# Patient Record
Sex: MALE | Race: WHITE
[De-identification: names, ages, dates, MRNs, and addresses within clinical notes are randomized per-mention and may not be internally consistent; named-entity substitution may affect disease eponyms.]

---

## 2018-11-20 ENCOUNTER — HOSPITAL ENCOUNTER (OUTPATIENT)
Dept: HOSPITAL 88 - OR | Age: 63
Discharge: HOME | End: 2018-11-20
Attending: INTERNAL MEDICINE
Payer: COMMERCIAL

## 2018-11-20 VITALS — SYSTOLIC BLOOD PRESSURE: 114 MMHG | DIASTOLIC BLOOD PRESSURE: 62 MMHG

## 2018-11-20 DIAGNOSIS — D12.4: ICD-10-CM

## 2018-11-20 DIAGNOSIS — I10: ICD-10-CM

## 2018-11-20 DIAGNOSIS — D12.3: ICD-10-CM

## 2018-11-20 DIAGNOSIS — D12.5: ICD-10-CM

## 2018-11-20 DIAGNOSIS — D12.8: ICD-10-CM

## 2018-11-20 DIAGNOSIS — G47.33: ICD-10-CM

## 2018-11-20 DIAGNOSIS — Z12.11: Primary | ICD-10-CM

## 2018-11-20 DIAGNOSIS — E78.5: ICD-10-CM

## 2018-11-20 DIAGNOSIS — Z01.810: ICD-10-CM

## 2018-11-20 DIAGNOSIS — K64.8: ICD-10-CM

## 2018-11-20 DIAGNOSIS — E11.9: ICD-10-CM

## 2018-11-20 DIAGNOSIS — Z79.84: ICD-10-CM

## 2018-11-20 DIAGNOSIS — D12.2: ICD-10-CM

## 2018-11-20 PROCEDURE — 36415 COLL VENOUS BLD VENIPUNCTURE: CPT

## 2018-11-20 PROCEDURE — 45378 DIAGNOSTIC COLONOSCOPY: CPT

## 2018-11-20 PROCEDURE — 93005 ELECTROCARDIOGRAM TRACING: CPT

## 2018-11-20 PROCEDURE — 45385 COLONOSCOPY W/LESION REMOVAL: CPT

## 2018-11-20 PROCEDURE — 82948 REAGENT STRIP/BLOOD GLUCOSE: CPT

## 2018-11-20 PROCEDURE — 45384 COLONOSCOPY W/LESION REMOVAL: CPT

## 2020-01-28 LAB
ANION GAP SERPL CALC-SCNC: 15.3 MMOL/L (ref 8–16)
BASOPHILS # BLD AUTO: 0.1 10*3/UL (ref 0–0.1)
BASOPHILS NFR BLD AUTO: 0.7 % (ref 0–1)
BUN SERPL-MCNC: 27 MG/DL (ref 7–26)
BUN/CREAT SERPL: 14 (ref 6–25)
CALCIUM SERPL-MCNC: 9.7 MG/DL (ref 8.4–10.2)
CHLORIDE SERPL-SCNC: 104 MMOL/L (ref 98–107)
CO2 SERPL-SCNC: 25 MMOL/L (ref 22–29)
DEPRECATED NEUTROPHILS # BLD AUTO: 7.1 10*3/UL (ref 2.1–6.9)
EGFRCR SERPLBLD CKD-EPI 2021: 36 ML/MIN (ref 60–?)
EOSINOPHIL # BLD AUTO: 0.4 10*3/UL (ref 0–0.4)
EOSINOPHIL NFR BLD AUTO: 3.5 % (ref 0–6)
ERYTHROCYTE [DISTWIDTH] IN CORD BLOOD: 13.6 % (ref 11.7–14.4)
GLUCOSE SERPLBLD-MCNC: 165 MG/DL (ref 74–118)
HCT VFR BLD AUTO: 41 % (ref 38.2–49.6)
HGB BLD-MCNC: 13.2 G/DL (ref 14–18)
LYMPHOCYTES # BLD: 1.5 10*3/UL (ref 1–3.2)
LYMPHOCYTES NFR BLD AUTO: 14.9 % (ref 18–39.1)
MCH RBC QN AUTO: 27.6 PG (ref 28–32)
MCHC RBC AUTO-ENTMCNC: 32.2 G/DL (ref 31–35)
MCV RBC AUTO: 85.6 FL (ref 81–99)
MONOCYTES # BLD AUTO: 1.1 10*3/UL (ref 0.2–0.8)
MONOCYTES NFR BLD AUTO: 10.6 % (ref 4.4–11.3)
NEUTS SEG NFR BLD AUTO: 69.7 % (ref 38.7–80)
PLATELET # BLD AUTO: 240 X10E3/UL (ref 140–360)
POTASSIUM SERPL-SCNC: 4.3 MMOL/L (ref 3.5–5.1)
RBC # BLD AUTO: 4.79 X10E6/UL (ref 4.3–5.7)
SODIUM SERPL-SCNC: 140 MMOL/L (ref 136–145)

## 2020-01-28 NOTE — DIAGNOSTIC IMAGING REPORT
EXAMINATION:  CHEST 2 VIEWS    



INDICATION: Pre-operative



COMPARISON: None

     

FINDINGS:



LINES/TUBES:None



LUNGS:The lungs are well-inflated. No focal consolidation or pulmonary edema.



PLEURA:No pleural effusion or pneumothorax.



MEDIASTINUM:The cardiomediastinal silhouette appears normal in size and shape.



BONES/SOFT TISSUES:No acute osseous injury.



ABDOMEN:No free air under the diaphragm.





IMPRESSION: 

No focal pneumonia or pulmonary edema.



Signed by: Madhu Leach MD on 1/28/2020 5:19 PM

## 2020-01-31 ENCOUNTER — HOSPITAL ENCOUNTER (OUTPATIENT)
Dept: HOSPITAL 88 - OR | Age: 65
Discharge: HOME | End: 2020-01-31
Attending: PODIATRIST
Payer: COMMERCIAL

## 2020-01-31 VITALS — SYSTOLIC BLOOD PRESSURE: 128 MMHG | DIASTOLIC BLOOD PRESSURE: 81 MMHG

## 2020-01-31 DIAGNOSIS — E11.9: ICD-10-CM

## 2020-01-31 DIAGNOSIS — Z01.811: ICD-10-CM

## 2020-01-31 DIAGNOSIS — G47.33: ICD-10-CM

## 2020-01-31 DIAGNOSIS — Z01.812: ICD-10-CM

## 2020-01-31 DIAGNOSIS — Z96.652: ICD-10-CM

## 2020-01-31 DIAGNOSIS — M79.671: ICD-10-CM

## 2020-01-31 DIAGNOSIS — I10: ICD-10-CM

## 2020-01-31 DIAGNOSIS — S92.351A: Primary | ICD-10-CM

## 2020-01-31 DIAGNOSIS — Z79.84: ICD-10-CM

## 2020-01-31 DIAGNOSIS — Z88.7: ICD-10-CM

## 2020-01-31 DIAGNOSIS — Z01.810: ICD-10-CM

## 2020-01-31 PROCEDURE — 71046 X-RAY EXAM CHEST 2 VIEWS: CPT

## 2020-01-31 PROCEDURE — 28485 OPTX METATARSAL FX EACH: CPT

## 2020-01-31 PROCEDURE — 80048 BASIC METABOLIC PNL TOTAL CA: CPT

## 2020-01-31 PROCEDURE — 36415 COLL VENOUS BLD VENIPUNCTURE: CPT

## 2020-01-31 PROCEDURE — 93005 ELECTROCARDIOGRAM TRACING: CPT

## 2020-01-31 PROCEDURE — 85025 COMPLETE CBC W/AUTO DIFF WBC: CPT

## 2020-01-31 PROCEDURE — 82948 REAGENT STRIP/BLOOD GLUCOSE: CPT

## 2020-01-31 NOTE — OPERATIVE REPORT
DATE OF PROCEDURE:  01/31/2020

 

SURGEON:  Kyle Koehler DPM

 

ROOM NUMBER:  [facility].

 

PREOPERATIVE DIAGNOSIS:  Ramos fracture, 5th metatarsal, right foot.

 

POSTOPERATIVE DIAGNOSIS:  Ramos fracture, 5th metatarsal, right foot.

 

TITLE OF THE OPERATION:  Open reduction with internal fixation, 5th metatarsal, right

foot. 

 

ANESTHESIA:  General endotracheal.

 

HEMOSTASIS:  A right thigh tourniquet at 350 mmHg.

 

PROCEDURE IN DETAIL:  The patient was taken to the operating room in a mildly sedated

state and placed on the operating table in supine position.  Following induction of

general anesthetic, the right lower extremity was elevated to 60 degrees to exsanguinate

before inflating the pneumatic thigh tourniquet to 350 mmHg to create hemostasis.  The

right lower extremity was placed on the operating table prior to performing the

following procedure: 

 

Procedure #1:  ORIF of the 5th metatarsal, right foot.  Under fluoroscopy, the Ramos

fracture was identified, noted to be significantly open and no bone healing had

occurred.  A linear longitudinal incision was made overlying the proximal 5th met base

all the way to an area just distal to the Ramos fracture, which was distal to the

proximal joint, but certainly still within the proximal one-half of the 5th metatarsal.

The bones seem to line up well when reorganize, but was very unstable.  A cannulated

screw was advanced 4.0 in nature from proximal 5th met base all the way through the

medial cortex of the 5th metatarsal distal to the fracture, which allowed for excellent

stabilization.  A 5th metatarsal compression plate was applied to the dorsal and lateral

aspect of the 5th metatarsal at this point, also showing excellent fixation and stable

level configuration and construct.  The area was irrigated with copious amounts of

sterile saline solution.  Deep closure with 3-0 Vicryl, subcutaneous closure with 4-0

Vicryl, and skin closure with 4-0 nylon.  The areas of surgery were then blocked with

0.5 Marcaine and Decadron LA.  Release of the pneumatic thigh tourniquet showed a normal

hyperemic flush to all digits of the right foot.  The patient tolerated both anesthetic

and procedure very well. 

 

 

 

 

______________________________

MARY BETH Manning/MODL

D:  01/31/2020 10:24:32

T:  01/31/2020 17:49:48

Job #:  [id number]

## 2020-01-31 NOTE — XMS REPORT
Patient Summary Document

                             Created on: 2020



BABATUNDE VICKERS

External Reference #: [id number]

: 1955

Sex: Male



Demographics







                          Address                   [address] [place] [address] [place]  [address]

 

                          Home Phone                [phone]

 

                          Preferred Language        Unknown

 

                          Marital Status            Unknown

 

                          Scientologist Affiliation     Unknown

 

                          Race                      Unknown

 

                                        Additional Race(s)  

 

                          Ethnic Group              Unknown





Author







                          Author                    [facility]

 

                          Address                   Unknown

 

                          Phone                     Unavailable







Care Team Providers







                    Care Team Member Name    Role                Phone

 

                    RADHA BARBOSA    Unavailable         Unavailable







Problems

This patient has no known problems.



Allergies, Adverse Reactions, Alerts

This patient has no known allergies or adverse reactions.



Medications

This patient has no known medications.



Results







           Test Description    Test Time    Test Comments    Text Results    Atomic Results    Result

 Comments

 

                CHEST 2 VIEWS    2020 17:18:00                                                             

                                             [facility]  
                     [address] [address]  
   Patient Name: BABATUNDE VICKERS                                   MR #: [id number]  
                  : 1955                                   Age/Sex: 
64/M  Acct #: [id number]                              Req #: [id number]  Adm 
Physician:                                                      Ordered by: 
RADHA BARBOSA DPM                            Report #: [id number]        
Location: OR                                      Room/Bed:                     
___________________________________________________________________________________________________
   Procedure: [id number] DX/CHEST 2 VIEWS  Exam Date:                            
Exam Time:                                               REPORT STATUS: Signed  
 EXAMINATION:  CHEST 2 VIEWS          INDICATION: Pre-operative      COMPARISON:
None           FINDINGS:      LINES/TUBES:None      LUNGS:The lungs are well-
inflated. No focal consolidation or pulmonary edema.      PLEURA:No pleural 
effusion or pneumothorax.      MEDIASTINUM:The cardiomediastinal silhouette 
appears normal in size and shape.      BONES/SOFT TISSUES:No acute osseous 
injury.      ABDOMEN:No free air under the diaphragm.         IMPRESSION:    No 
focal pneumonia or pulmonary edema.      Signed by: Daniella Hicks MD on 2020 
5:19 PM        Dictated By: DANIELLA HICKS MD  Electronically Signed By: DANIELLA HICKS MD on 20  Transcribed By: JACKIE on 20       COPY TO:   
RADHA BARBOSA DPM

## 2020-11-19 ENCOUNTER — HOSPITAL ENCOUNTER (EMERGENCY)
Dept: HOSPITAL 88 - ER | Age: 65
Discharge: HOME | End: 2020-11-19
Payer: COMMERCIAL

## 2020-11-19 VITALS — HEIGHT: 68 IN | BODY MASS INDEX: 47.74 KG/M2 | WEIGHT: 315 LBS

## 2020-11-19 VITALS — DIASTOLIC BLOOD PRESSURE: 89 MMHG | SYSTOLIC BLOOD PRESSURE: 129 MMHG

## 2020-11-19 DIAGNOSIS — R06.02: ICD-10-CM

## 2020-11-19 DIAGNOSIS — E78.5: ICD-10-CM

## 2020-11-19 DIAGNOSIS — E11.9: ICD-10-CM

## 2020-11-19 DIAGNOSIS — R50.9: ICD-10-CM

## 2020-11-19 DIAGNOSIS — I10: ICD-10-CM

## 2020-11-19 DIAGNOSIS — J12.9: Primary | ICD-10-CM

## 2020-11-19 DIAGNOSIS — E78.00: ICD-10-CM

## 2020-11-19 DIAGNOSIS — R05: ICD-10-CM

## 2020-11-19 PROCEDURE — 71045 X-RAY EXAM CHEST 1 VIEW: CPT

## 2020-11-19 PROCEDURE — 99283 EMERGENCY DEPT VISIT LOW MDM: CPT

## 2020-11-19 NOTE — EMERGENCY DEPARTMENT NOTE
History of Present Illnes


History of Present Illness


Chief Complaint:  COVID PUI


History of Present Illness


This is a 65 year old  male in from home with complaints of shortness 

of breath, cough, fever and


   general body aches since Saturday. Patient reports he went to get covid 

   testing


   two days ago but has not received results yet. Patient is slightly short of


   breath on exertion but no distress noted at rest. Patient's oxygen saturation

   in


   triage is 97% on room air. pt states both daughters positive for covid


Historian:  Patient


Arrival Mode:  Car


 Required:  No


Onset (how long ago):  day(s) (5)


Location:  all over


Quality:  fever, chills, body aches, cough


Radiation:  Reports non-radiation


Severity:  mild


Onset quality:  gradual


Duration (how long):  day(s) (6)


Timing of current episode:  constant


Progression:  unchanged


Chronicity:  new


Context:  Reports other (pt with family members who are positive for covid)


Relieving factors:  none


Exacerbating factors:  other (cough)


Associated symptoms:  Reports denies other symptoms





Past Medical/Family History


Physician Review


I have reviewed the patient's past medical and family history.  Any updates have

been documented here.





Past Medical History


Recent Fever:  Yes


Clinical Suspicion of Infectio:  Yes


New/Unexplained Change in Ment:  No


Past Medical History:  Hypertension, Diabetes, Hyperlipedemia


Other Medical History:  


High cholesterol





Venous stasis


Other Surgery:  


removal of cyst from neck  





L knee surgery





Social History


Smoking Cessation:  Never Smoker


Alcohol Use:  Occasional


Any Illegal Drug Use:  No





Other


Last Tetanus:  OOD





Review of Systems


Review of Systems


Constitutional:  Reports as per HPI


EENTM:  Reports no symptoms


Cardiovascular:  Reports no symptoms


Respiratory:  Reports as per HPI


Gastrointestinal:  Reports no symptoms


Genitourinary:  Reports no symptoms


Musculoskeletal:  Reports no symptoms


Integumentary:  Reports no symptoms


Neurological:  Reports no symptoms


Psychological:  Reports no symptoms


Endocrine:  Reports no symptoms


Hematological/Lymphatic:  Reports no symptoms





Physical Exam


Related Data


Allergies:  


Uncoded Allergies:  


     FLU VACCINE (Allergy, Unknown, 6/14/17)


Triage Vital Signs





Vital Signs








  Date Time  Temp Pulse Resp B/P (MAP) Pulse Ox O2 Delivery O2 Flow Rate FiO2


 


11/19/20 17:10 99.4 100 22 119/58 97 Room Air  








Vital signs reviewed:  Yes





Physical Exam


CONSTITUTIONAL





Constitutional:  Present well-developed, Present well-nourished


HENT


HENT:  Present normocephalic, Present atraumatic, Present oropharynx 

clear/moist, Present nose normal


HENT L/R:  Present left ext ear normal, Present right ext ear normal


EYES





Eyes:  Reports PERRL, Reports conjunctivae normal


NECK


Neck:  Present ROM normal


PULMONARY


Pulmonary:  Present effort normal, Present rhonchi (mild bases), Present other 

(mild decreased breath sounds bilateral bases)


CARDIOVASCULAR





Cardiovascular:  Present regular rhythm, Present heart sounds normal, Present 

capillary refill normal, Present normal rate


GASTROINTESTINAL





Abdominal:  Present soft, Present nontender, Present bowel sounds normal


GENITOURINARY





Genitourinary:  Present exam deferred


SKIN


Skin:  Present warm, Present dry


MUSCULOSKELETAL





Musculoskeletal:  Present ROM normal


NEUROLOGICAL





Neurological:  Present alert, Present oriented x 3, Present no gross motor or 

sensory deficits


PSYCHOLOGICAL


Psychological:  Present mood/affect normal, Present judgement normal





Results


Imaging


Imaging results reviewed:  Yes


Impressions


Procedure: [id number] DX/CHEST SINGLE (PORTABLE)


Exam Date: 11/19/20                            Exam Time: 1833








                              REPORT STATUS: Signed





EXAMINATION:  CHEST SINGLE (PORTABLE)    





INDICATION:      


^Y


^sob,covid suspect


^[id number]


^1833


^Y





COMPARISON:  Chest x-ray on 1/28/2020.


     


FINDINGS:    





TUBES and LINES:  None.





LUNGS: There is diffuse interstitial lung markings and patchy and hazy


opacification the bilateral lung bases..





PLEURA:  No pleural effusion or pneumothorax.





HEART AND MEDIASTINUM:  The cardiomediastinal silhouette is unremarkable.    





BONES AND SOFT TISSUES:  No acute osseous lesion.  Soft tissues are


unremarkable.





UPPER ABDOMEN: No free air under the diaphragm.    





IMPRESSION: 





Patchy and hazy opacification bilateral lung bases which most likely represents


atelectasis and/or multifocal pneumonia in the proper clinical context. There


is probable superimposed pulmonary edema.








Signed by: Germania Gilman MD on 11/19/2020 6:59 PM








Dictated By: GERMANIA GILMAN MD


Electronically Signed By: GERMANIA GILMAN MD on 11/19/20 1859


Transcribed By: JACKIE on 11/19/20 1859 








COPY TO:   MIGUEL DRAPER MD~





Assessment & Plan


Medical Decision Making


MDM


pt with covid symptoms and exposure to family members with covid





cxr ordered to eval for pneumonia





Assessment & Plan


Final Impression:  


(1) Suspected 2019 novel coronavirus infection


(2) Viral pneumonia


Depart Disposition:  HOME, SELF-CARE


Last Vital Signs











  Date Time  Temp Pulse Resp B/P (MAP) Pulse Ox O2 Delivery O2 Flow Rate FiO2


 


11/19/20 17:10 99.4 100 22 119/58 97 Room Air  








Home Meds


Reported Medications


Losartan Potassium (LOSARTAN POTASSIUM) 25 Mg Tablet, 50 MG PO DAILY


   6/14/17


Simvastatin (SIMVASTATIN) 20 Mg Tablet, 20 MG PO DAILY, EA


   6/14/17


Metformin Hcl (METFORMIN HCL ER) 500 Mg Tab.er.24, 500 MG PO TID, #60 TAB


   6/14/17


Triamcinolone Acetonide (TRIAMCINOLONE ACETONIDE) 15 Gm Oint...g., 15 GM TP PRN,

G


   6/14/17











MIGUEL DRAPER MD        Nov 19, 2020 18:20

## 2020-11-19 NOTE — DIAGNOSTIC IMAGING REPORT
EXAMINATION:  CHEST SINGLE (PORTABLE)    



INDICATION:      

^Y

^sob,covid suspect

^[id number]

^1833

^Y



COMPARISON:  Chest x-ray on 1/28/2020.

     

FINDINGS:    



TUBES and LINES:  None.



LUNGS: There is diffuse interstitial lung markings and patchy and hazy

opacification the bilateral lung bases..



PLEURA:  No pleural effusion or pneumothorax.



HEART AND MEDIASTINUM:  The cardiomediastinal silhouette is unremarkable.    



BONES AND SOFT TISSUES:  No acute osseous lesion.  Soft tissues are

unremarkable.



UPPER ABDOMEN: No free air under the diaphragm.    



IMPRESSION: 



Patchy and hazy opacification bilateral lung bases which most likely represents

atelectasis and/or multifocal pneumonia in the proper clinical context. There

is probable superimposed pulmonary edema.





Signed by: Germania Simmons MD on 11/19/2020 6:59 PM

## 2020-11-19 NOTE — XMS REPORT
Continuity of Care Document

                             Created on: 2020



BABATUNDE VICKERS

External Reference #: [id number]

: 1955

Sex: Male



Demographics





                          Address                   [address] [place]  [address]

 

                          Home Phone                [phone]

 

                          Preferred Language        Unknown

 

                          Marital Status            Unknown

 

                          Restoration Affiliation     Unknown

 

                          Race                      Unknown

 

                          Ethnic Group              Unknown





Author





                          Author                    [facility]

 

                          Organization              [facility]

 

                          Address                   [address] [place] [address] [place] [address]  [address]



 

                          Phone                     Unavailable







Care Team Providers





                    Care Team Member Name Role                Phone

 

                    RADHA BARBOSA    Attphys             Unavailable







Problems

This patient has no known problems.



Allergies, Adverse Reactions, Alerts

This patient has no known allergies or adverse reactions.



Medications

This patient has no known medications.



Procedures

This patient has no known procedures.



Results





           Test Description Test Time  Test Comments Results    Result Comments 

Source

 

                CHEST 2 VIEWS   2020 17:18:00                             

                                

                                         [facility]      
                 [address] [place], [place] [address]      
Patient Name: BABATUNDE VICKERS                                   MR #: [id number]     
               : 1955                                   Age/Sex: 64/M  
Acct #: [id number]                              Req #: [id number]  Adm 
Physician:                                                      Ordered by: 
RADHA BARBOSA DPM                            Report #: [id number]        
Location: OR                                      Room/Bed:                     
________________________________________________________________________________

___________________    Procedure: [id number] DX/CHEST 2 VIEWS  Exam Date:        
                    Exam Time:                                               
REPORT STATUS: Signed    EXAMINATION:  CHEST 2 VIEWS          INDICATION: Pre-
operative      COMPARISON: None           FINDINGS:      LINES/TUBES:None      
LUNGS:The lungs are well-inflated. No focal consolidation or pulmonary edema.   
  PLEURA:No pleural effusion or pneumothorax.      MEDIASTINUM:The 
cardiomediastinal silhouette appears normal in size and shape.      BONES/SOFT 
TISSUES:No acute osseous injury.      ABDOMEN:No free air under the diaphragm.  
      IMPRESSION:    No focal pneumonia or pulmonary edema.      Signed by: 
Daniella Hicks MD on 2020 5:19 PM        Dictated By: DANIELLA HICKS MD  
Electronically Signed By: DANIELLA HICKS MD on 20  Transcribed By: 
JACKIE on 20       COPY TO:   RADHA BARBOSA DPM